# Patient Record
Sex: FEMALE | Race: WHITE | NOT HISPANIC OR LATINO | URBAN - METROPOLITAN AREA
[De-identification: names, ages, dates, MRNs, and addresses within clinical notes are randomized per-mention and may not be internally consistent; named-entity substitution may affect disease eponyms.]

---

## 2017-01-09 ENCOUNTER — AMBULATORY - HEALTHEAST (OUTPATIENT)
Dept: CARDIOLOGY | Facility: CLINIC | Age: 82
End: 2017-01-09

## 2017-01-09 DIAGNOSIS — Z95.0 PACEMAKER: ICD-10-CM

## 2017-05-12 ENCOUNTER — AMBULATORY - HEALTHEAST (OUTPATIENT)
Dept: CARDIOLOGY | Facility: CLINIC | Age: 82
End: 2017-05-12

## 2017-05-16 ENCOUNTER — AMBULATORY - HEALTHEAST (OUTPATIENT)
Dept: CARDIOLOGY | Facility: CLINIC | Age: 82
End: 2017-05-16

## 2017-05-16 DIAGNOSIS — I10 ESSENTIAL HYPERTENSION WITH GOAL BLOOD PRESSURE LESS THAN 140/90: ICD-10-CM

## 2017-05-16 DIAGNOSIS — I48.20 CHRONIC ATRIAL FIBRILLATION (H): ICD-10-CM

## 2017-05-16 DIAGNOSIS — Z95.0 CARDIAC RESYNCHRONIZATION THERAPY PACEMAKER (CRT-P) IN PLACE: ICD-10-CM

## 2017-05-16 DIAGNOSIS — Z95.0 PACEMAKER: ICD-10-CM

## 2017-05-16 DIAGNOSIS — I10 ESSENTIAL HYPERTENSION WITH GOAL BLOOD PRESSURE LESS THAN 130/85: ICD-10-CM

## 2017-05-16 ASSESSMENT — MIFFLIN-ST. JEOR: SCORE: 1291.78

## 2017-05-17 LAB — HCC DEVICE COMMENTS: NORMAL

## 2017-06-02 ENCOUNTER — OFFICE VISIT - HEALTHEAST (OUTPATIENT)
Dept: CARDIOLOGY | Facility: CLINIC | Age: 82
End: 2017-06-02

## 2017-06-02 DIAGNOSIS — I10 ESSENTIAL HYPERTENSION WITH GOAL BLOOD PRESSURE LESS THAN 130/85: ICD-10-CM

## 2017-06-02 ASSESSMENT — MIFFLIN-ST. JEOR: SCORE: 1282.71

## 2017-06-29 ENCOUNTER — COMMUNICATION - HEALTHEAST (OUTPATIENT)
Dept: CARDIOLOGY | Facility: CLINIC | Age: 82
End: 2017-06-29

## 2017-08-09 ENCOUNTER — AMBULATORY - HEALTHEAST (OUTPATIENT)
Dept: CARDIOLOGY | Facility: CLINIC | Age: 82
End: 2017-08-09

## 2017-08-09 DIAGNOSIS — Z95.0 PACEMAKER: ICD-10-CM

## 2017-08-09 LAB — HCC DEVICE COMMENTS: NORMAL

## 2017-10-30 ENCOUNTER — AMBULATORY - HEALTHEAST (OUTPATIENT)
Dept: CARDIOLOGY | Facility: CLINIC | Age: 82
End: 2017-10-30

## 2017-10-30 DIAGNOSIS — Z95.0 CARDIAC RESYNCHRONIZATION THERAPY PACEMAKER (CRT-P) IN PLACE: ICD-10-CM

## 2017-10-31 LAB — HCC DEVICE COMMENTS: NORMAL

## 2018-02-05 ENCOUNTER — AMBULATORY - HEALTHEAST (OUTPATIENT)
Dept: CARDIOLOGY | Facility: CLINIC | Age: 83
End: 2018-02-05

## 2018-02-05 DIAGNOSIS — Z95.0 CARDIAC RESYNCHRONIZATION THERAPY PACEMAKER (CRT-P) IN PLACE: ICD-10-CM

## 2018-02-05 LAB — HCC DEVICE COMMENTS: NORMAL

## 2018-04-23 LAB
CREAT SERPL-MCNC: 0.9 MG/DL (ref 0.57–1.11)
GFR SERPL CREATININE-BSD FRML MDRD: 59 ML/MIN/1.73M2
GLUCOSE SERPL-MCNC: 85 MG/DL (ref 70–95)
POTASSIUM SERPL-SCNC: 4.3 MMOL/L (ref 3.5–5.1)
TSH SERPL-ACNC: 3.88 UIU/ML (ref 0.35–4.94)

## 2018-05-29 ENCOUNTER — OFFICE VISIT (OUTPATIENT)
Dept: DERMATOLOGY | Facility: CLINIC | Age: 83
End: 2018-05-29
Payer: MEDICARE

## 2018-05-29 VITALS — DIASTOLIC BLOOD PRESSURE: 67 MMHG | OXYGEN SATURATION: 97 % | SYSTOLIC BLOOD PRESSURE: 131 MMHG | HEART RATE: 66 BPM

## 2018-05-29 DIAGNOSIS — L82.1 SK (SEBORRHEIC KERATOSIS): ICD-10-CM

## 2018-05-29 DIAGNOSIS — L81.4 LENTIGO: Primary | ICD-10-CM

## 2018-05-29 DIAGNOSIS — L85.3 XEROSIS OF SKIN: ICD-10-CM

## 2018-05-29 DIAGNOSIS — D18.00 ANGIOMA: ICD-10-CM

## 2018-05-29 PROCEDURE — 99203 OFFICE O/P NEW LOW 30 MIN: CPT | Performed by: DERMATOLOGY

## 2018-05-29 RX ORDER — LEVOTHYROXINE SODIUM 112 MCG
112 TABLET ORAL DAILY
COMMUNITY
Start: 2018-05-01

## 2018-05-29 RX ORDER — ESCITALOPRAM OXALATE 10 MG/1
10 TABLET ORAL DAILY
COMMUNITY
Start: 2018-05-25

## 2018-05-29 RX ORDER — RIVAROXABAN 20 MG/1
TABLET, FILM COATED ORAL
Refills: 1 | COMMUNITY
Start: 2018-04-18 | End: 2018-08-23

## 2018-05-29 NOTE — MR AVS SNAPSHOT
After Visit Summary   5/29/2018    Karina Marie    MRN: [id number]           Patient Information     Date Of Birth          3/5/1931        Visit Information        Provider Department      5/29/2018 2:15 PM Asim Ignacio MD [facility]        Today's Diagnoses     Lentigo    -  1    SK (seborrheic keratosis)        Angioma        Xerosis of skin          Care Instructions    1. Take Claritin in the morning  2. Take Zyrtec at night  3. Follow skin care recommendations below  4. Recheck in 2 months      Proper skin care from Dr. Ignacio- [facility]     Eliminate harsh soaps, i.e. Dial, Zest, [place] Spring;   Use mild soaps such as Cetaphil or Dove Sensitive Skin   Avoid hot or cold showers   After showering, lightly dry off.    Aggressive use of a moisturizer (including Vanicream, Cetaphil, Aquaphor or Cerave)   We recommend using a tub that needs to be scooped out, not a pump. This has more of an oil base. It will hold moisture in your skin much better than a water base moisturizer. The ones recommended are non- pore clogging.       If you have any questions call [phone] and follow the prompts to Dr. Ignacio's office.             Follow-ups after your visit        Who to contact     If you have questions or need follow up information about today's clinic visit or your schedule please contact [facility] directly at [phone].  Normal or non-critical lab and imaging results will be communicated to you by MyChart, letter or phone within 4 business days after the clinic has received the results. If you do not hear from us within 7 days, please contact the clinic through MyChart or phone. If you have a critical or abnormal lab result, we will notify you by phone as soon as possible.  Submit refill requests through [employer] or call your pharmacy and they will forward the refill request to us. Please allow 3 business days for your refill to be completed.           Additional Information About Your Visit        Care EveryWhere ID     This is your Care EveryWhere ID. This could be used by other organizations to access your [place] medical records  [id number]        Your Vitals Were     Pulse Pulse Oximetry                66 97%           Blood Pressure from Last 3 Encounters:   05/29/18 131/67   05/26/15 143/66   05/11/15 139/71    Weight from Last 3 Encounters:   No data found for Wt              Today, you had the following     No orders found for display       Primary Care Provider Office Phone # Fax #    Char Catarino Dutta [phone] [address] [facility] [address]        Equal Access to Services     EVELINA BAER : Hadii aad ku hadasho Sovanitaali, waaxda luqadaha, qaybta kaalmada adeegyada, prasanna muhammad . So [facility] [phone].    ATENCIÓN: Si habla español, tiene a morris disposición servicios gratuitos de asistencia lingüística. Llame al [phone].    We comply with applicable federal civil rights laws and [place] laws. We do not discriminate on the basis of race, color, national origin, age, disability, sex, sexual orientation, or gender identity.            Thank you!     Thank you for choosing [facility]  for your care. Our goal is always to provide you with excellent care. Hearing back from our patients is one way we can continue to improve our services. Please take a few minutes to complete the written survey that you may receive in the mail after your visit with us. Thank you!             Your Updated Medication List - Protect others around you: Learn how to safely use, store and throw away your medicines at www.disposemymeds.org.          This list is accurate as of 5/29/18  2:44 PM.  Always use your most recent med list.                   Brand Name Dispense Instructions for use Diagnosis    ACETAMINOPHEN PO       Nummular dermatitis       CALCIUM 600 PO        Nummular dermatitis       CARVEDILOL PO       Nummular dermatitis       CENTRUM SILVER per tablet      Take 1 tablet by mouth daily    Nummular dermatitis       cetirizine 10 MG tablet    zyrTEC    90 tablet    Take 1 tablet (10 mg) by mouth every evening    Nummular dermatitis       desoximetasone 0.25 % cream    TOPICORT    60 g    Apply topically 2 times daily Apply sparingly to affected area twice daily.  Do not apply to face.    Nummular dermatitis       escitalopram 10 MG tablet    LEXAPRO          * levothyroxine 100 MCG tablet    SYNTHROID/LEVOTHROID     Take by mouth daily    Nummular dermatitis       * SYNTHROID 112 MCG tablet   Generic drug:  levothyroxine           warfarin 2.5 MG tablet    COUMADIN     Take by mouth daily    Nummular dermatitis       XARELTO 20 MG Tabs tablet   Generic drug:  rivaroxaban ANTICOAGULANT           * Notice:  This list has 2 medication(s) that are the same as other medications prescribed for you. Read the directions carefully, and ask your doctor or other care provider to review them with you.

## 2018-05-29 NOTE — PROGRESS NOTES
Karina Marie is a 87 year old year old female patient here today for itching on back.   .  Patient states this has been present for a while.  Using dove soap.  .  Patient reports the following symptoms:  itching.  Patient reports the following previous treatments none.  Patient reports the following modifying factors none.  Associated symptoms: none.  Patient has no other skin complaints today.  Remainder of the HPI, Meds, PMH, Allergies, FH, and SH was reviewed in chart.    History reviewed. No pertinent past medical history.    History reviewed. No pertinent surgical history.     History reviewed. No pertinent family history.    Social History     Social History     Marital status:      Spouse name: N/A     Number of children: N/A     Years of education: N/A     Occupational History     Not on file.     Social History Main Topics     Smoking status: Never Smoker     Smokeless tobacco: Never Used     Alcohol use Not on file     Drug use: Not on file     Sexual activity: Not on file     Other Topics Concern     Not on file     Social History Narrative       Outpatient Encounter Prescriptions as of 5/29/2018   Medication Sig Dispense Refill     ACETAMINOPHEN PO        Calcium Carbonate (CALCIUM 600 PO)        CARVEDILOL PO        cetirizine (ZYRTEC) 10 MG tablet Take 1 tablet (10 mg) by mouth every evening (Patient not taking: Reported on 5/29/2018) 90 tablet 3     desoximetasone (TOPICORT) 0.25 % cream Apply topically 2 times daily Apply sparingly to affected area twice daily.  Do not apply to face. (Patient not taking: Reported on 5/29/2018) 60 g 2     escitalopram (LEXAPRO) 10 MG tablet        levothyroxine (SYNTHROID,LEVOTHROID) 100 MCG tablet Take by mouth daily       Multiple Vitamins-Minerals (CENTRUM SILVER) per tablet Take 1 tablet by mouth daily       SYNTHROID 112 MCG tablet        warfarin (COUMADIN) 2.5 MG tablet Take by mouth daily       XARELTO 20 MG TABS tablet   1     No  facility-administered encounter medications on file as of 5/29/2018.              Review Of Systems  Skin: As above  Eyes: negative  Ears/Nose/Throat: negative  Respiratory: No shortness of breath, dyspnea on exertion, cough, or hemoptysis  Cardiovascular: negative  Gastrointestinal: negative  Genitourinary: negative  Musculoskeletal: negative  Neurologic: negative  Psychiatric: negative  Hematologic/Lymphatic/Immunologic: negative  Endocrine: negative      O:   NAD, WDWN, Alert & Oriented, Mood & Affect wnl, Vitals stable   Here today with     There were no vitals taken for this visit.   General appearance normal   Vitals stable   Alert, oriented and in no acute distress      Following lymph nodes palpated: Occipital, Cervical, Supraclavicular no lad   Stuck on papules and brown macules on trunk and ext    Red papule son trunk     Xerosis throughout      No visible rash   The remainder of expanded problem focused exam was unremarkable; the following areas were examined:  scalp/hair, conjunctiva/lids, face, neck, lips, backRUE, LUE.      Eyes: Conjunctivae/lids:Normal     ENT: Lips, buccal mucosa, tongue: normal    MSK:Normal    Cardiovascular: peripheral edema none    Pulm: Breathing Normal    Lymph Nodes: No Head and Neck Lymphadenopathy     Neuro/Psych: Orientation:Normal; Mood/Affect:Normal      A/P:  1. Seborrheic keratosis, lentigo, angioma  2. Xerosis   calritin in am  Zyrtec bedtime  Emollient use discussed with patient   Return to clinic 2 months    BENIGN LESIONS DISCUSSED WITH PATIENT:  I discussed the specifics of tumor, prognosis, and genetics of benign lesions.  I explained that treatment of these lesions would be purely cosmetic and not medically neccessary.  I discussed with patient different removal options including excision, cautery and /or laser.      Nature and genetics of benign skin lesions dicussed with patient.  Signs and Symptoms of skin cancer discussed with patient.  Patient to  follow up with Primary Care provider regarding elevated blood pressure.  Patient encouraged to perform monthly skin exams.  UV precautions reviewed with patient.  Patient to follow up with Primary Care provider regarding elevated blood pressure.  Skin care regimen reviewed with patient: Eliminate harsh soaps, i.e. Dial, zest, irsih spring; Mild soaps such as Cetaphil or Dove sensitive skin, avoid hot or cold showers, aggressive use of emollients including vanicream, cetaphil or cerave discussed with patient.    Risks of non-melanoma skin cancer discussed with patient   Return to clinic 2 months

## 2018-05-29 NOTE — LETTER
5/29/2018         RE: Karina Marie  [address] [place] [id number]        Dear Colleague,    Thank you for referring your patient, Karina Marie, to the [facility]. Please see a copy of my visit note below.    Karina Marie is a 87 year old year old female patient here today for itching on back.   .  Patient states this has been present for a while.  Using dove soap.  .  Patient reports the following symptoms:  itching.  Patient reports the following previous treatments none.  Patient reports the following modifying factors none.  Associated symptoms: none.  Patient has no other skin complaints today.  Remainder of the HPI, Meds, PMH, Allergies, FH, and SH was reviewed in chart.    History reviewed. No pertinent past medical history.    History reviewed. No pertinent surgical history.     History reviewed. No pertinent family history.    Social History     Social History     Marital status:      Spouse name: N/A     Number of children: N/A     Years of education: N/A     Occupational History     Not on file.     Social History Main Topics     Smoking status: Never Smoker     Smokeless tobacco: Never Used     Alcohol use Not on file     Drug use: Not on file     Sexual activity: Not on file     Other Topics Concern     Not on file     Social History Narrative       Outpatient Encounter Prescriptions as of 5/29/2018   Medication Sig Dispense Refill     ACETAMINOPHEN PO        Calcium Carbonate (CALCIUM 600 PO)        CARVEDILOL PO        cetirizine (ZYRTEC) 10 MG tablet Take 1 tablet (10 mg) by mouth every evening (Patient not taking: Reported on 5/29/2018) 90 tablet 3     desoximetasone (TOPICORT) 0.25 % cream Apply topically 2 times daily Apply sparingly to affected area twice daily.  Do not apply to face. (Patient not taking: Reported on 5/29/2018) 60 g 2     escitalopram (LEXAPRO) 10 MG tablet        levothyroxine (SYNTHROID,LEVOTHROID) 100 MCG tablet Take by mouth daily        Multiple Vitamins-Minerals (CENTRUM SILVER) per tablet Take 1 tablet by mouth daily       SYNTHROID 112 MCG tablet        warfarin (COUMADIN) 2.5 MG tablet Take by mouth daily       XARELTO 20 MG TABS tablet   1     No facility-administered encounter medications on file as of 5/29/2018.              Review Of Systems  Skin: As above  Eyes: negative  Ears/Nose/Throat: negative  Respiratory: No shortness of breath, dyspnea on exertion, cough, or hemoptysis  Cardiovascular: negative  Gastrointestinal: negative  Genitourinary: negative  Musculoskeletal: negative  Neurologic: negative  Psychiatric: negative  Hematologic/Lymphatic/Immunologic: negative  Endocrine: negative      O:   NAD, WDWN, Alert & Oriented, Mood & Affect wnl, Vitals stable   Here today with     There were no vitals taken for this visit.   General appearance normal   Vitals stable   Alert, oriented and in no acute distress      Following lymph nodes palpated: Occipital, Cervical, Supraclavicular no lad   Stuck on papules and brown macules on trunk and ext    Red papule son trunk     Xerosis throughout      No visible rash   The remainder of expanded problem focused exam was unremarkable; the following areas were examined:  scalp/hair, conjunctiva/lids, face, neck, lips, backRUE, LUE.      Eyes: Conjunctivae/lids:Normal     ENT: Lips, buccal mucosa, tongue: normal    MSK:Normal    Cardiovascular: peripheral edema none    Pulm: Breathing Normal    Lymph Nodes: No Head and Neck Lymphadenopathy     Neuro/Psych: Orientation:Normal; Mood/Affect:Normal      A/P:  1. Seborrheic keratosis, lentigo, angioma  2. Xerosis   calritin in am  Zyrtec bedtime  Emollient use discussed with patient   Return to clinic 2 months    BENIGN LESIONS DISCUSSED WITH PATIENT:  I discussed the specifics of tumor, prognosis, and genetics of benign lesions.  I explained that treatment of these lesions would be purely cosmetic and not medically neccessary.  I discussed with  patient different removal options including excision, cautery and /or laser.      Nature and genetics of benign skin lesions dicussed with patient.  Signs and Symptoms of skin cancer discussed with patient.  Patient to follow up with Primary Care provider regarding elevated blood pressure.  Patient encouraged to perform monthly skin exams.  UV precautions reviewed with patient.  Patient to follow up with Primary Care provider regarding elevated blood pressure.  Skin care regimen reviewed with patient: Eliminate harsh soaps, i.e. Dial, zest, irsih spring; Mild soaps such as Cetaphil or Dove sensitive skin, avoid hot or cold showers, aggressive use of emollients including vanicream, cetaphil or cerave discussed with patient.    Risks of non-melanoma skin cancer discussed with patient   Return to clinic 2 months      Again, thank you for allowing me to participate in the care of your patient.        Sincerely,        Asim Ignacio MD

## 2018-05-29 NOTE — PATIENT INSTRUCTIONS
1. Take Claritin in the morning  2. Take Zyrtec at night  3. Follow skin care recommendations below  4. Recheck in 2 months      Proper skin care from Dr. Ignacio- [facility]     Eliminate harsh soaps, i.e. Dial, Zest, [place] Spring;   Use mild soaps such as Cetaphil or Dove Sensitive Skin   Avoid hot or cold showers   After showering, lightly dry off.    Aggressive use of a moisturizer (including Vanicream, Cetaphil, Aquaphor or Cerave)   We recommend using a tub that needs to be scooped out, not a pump. This has more of an oil base. It will hold moisture in your skin much better than a water base moisturizer. The ones recommended are non- pore clogging.       If you have any questions call [phone] and follow the prompts to Dr. Ignacio's office.

## 2018-05-30 ENCOUNTER — AMBULATORY - HEALTHEAST (OUTPATIENT)
Dept: CARDIOLOGY | Facility: CLINIC | Age: 83
End: 2018-05-30

## 2018-05-30 DIAGNOSIS — Z95.0 CARDIAC RESYNCHRONIZATION THERAPY PACEMAKER (CRT-P) IN PLACE: ICD-10-CM

## 2018-06-01 LAB
HCC DEVICE COMMENTS: NORMAL
HCC DEVICE IMPLANTING PROVIDER: NORMAL
HCC DEVICE MANUFACTURE: NORMAL
HCC DEVICE MODEL: NORMAL
HCC DEVICE SERIAL NUMBER: NORMAL
HCC DEVICE TYPE: NORMAL

## 2018-06-13 ENCOUNTER — TRANSFERRED RECORDS (OUTPATIENT)
Dept: HEALTH INFORMATION MANAGEMENT | Facility: CLINIC | Age: 83
End: 2018-06-13

## 2018-06-19 ENCOUNTER — TRANSFERRED RECORDS (OUTPATIENT)
Dept: HEALTH INFORMATION MANAGEMENT | Facility: CLINIC | Age: 83
End: 2018-06-19

## 2018-06-22 ENCOUNTER — TRANSFERRED RECORDS (OUTPATIENT)
Dept: HEALTH INFORMATION MANAGEMENT | Facility: CLINIC | Age: 83
End: 2018-06-22

## 2018-07-30 ENCOUNTER — AMBULATORY - HEALTHEAST (OUTPATIENT)
Dept: CARDIOLOGY | Facility: CLINIC | Age: 83
End: 2018-07-30

## 2018-07-30 DIAGNOSIS — I10 ESSENTIAL HYPERTENSION: ICD-10-CM

## 2018-07-30 DIAGNOSIS — Z95.0 CARDIAC RESYNCHRONIZATION THERAPY PACEMAKER (CRT-P) IN PLACE: ICD-10-CM

## 2018-07-30 DIAGNOSIS — F03.90 SENILE DEMENTIA (H): ICD-10-CM

## 2018-07-30 DIAGNOSIS — I48.20 CHRONIC ATRIAL FIBRILLATION (H): ICD-10-CM

## 2018-07-30 DIAGNOSIS — I34.0 NON-RHEUMATIC MITRAL REGURGITATION: ICD-10-CM

## 2018-07-30 ASSESSMENT — MIFFLIN-ST. JEOR: SCORE: 1296.32

## 2018-08-23 PROBLEM — R41.89 IMPAIRED COGNITION: Status: ACTIVE | Noted: 2018-02-27

## 2018-08-23 PROBLEM — I10 ESSENTIAL HYPERTENSION: Status: ACTIVE | Noted: 2018-08-23

## 2018-08-23 PROBLEM — E03.9 HYPOTHYROIDISM: Status: ACTIVE | Noted: 2018-08-23

## 2018-08-23 PROBLEM — E04.1 RIGHT THYROID NODULE: Status: ACTIVE | Noted: 2017-04-25

## 2018-08-23 PROBLEM — K58.9 IRRITABLE BOWEL SYNDROME: Status: ACTIVE | Noted: 2018-08-23

## 2018-08-23 PROBLEM — E78.5 HYPERLIPIDEMIA: Status: ACTIVE | Noted: 2018-08-23

## 2018-08-23 PROBLEM — E66.9 OBESITY: Status: ACTIVE | Noted: 2018-08-23

## 2018-08-23 PROBLEM — K90.41 GLUTEN INTOLERANCE: Status: ACTIVE | Noted: 2018-03-09

## 2018-08-23 PROBLEM — I34.0 NON-RHEUMATIC MITRAL REGURGITATION: Status: ACTIVE | Noted: 2018-08-23

## 2018-10-31 ENCOUNTER — AMBULATORY - HEALTHEAST (OUTPATIENT)
Dept: CARDIOLOGY | Facility: CLINIC | Age: 83
End: 2018-10-31

## 2018-10-31 DIAGNOSIS — Z95.0 CARDIAC RESYNCHRONIZATION THERAPY PACEMAKER (CRT-P) IN PLACE: ICD-10-CM

## 2019-02-22 ENCOUNTER — AMBULATORY - HEALTHEAST (OUTPATIENT)
Dept: CARDIOLOGY | Facility: CLINIC | Age: 84
End: 2019-02-22

## 2019-02-22 DIAGNOSIS — Z95.0 CARDIAC RESYNCHRONIZATION THERAPY PACEMAKER (CRT-P) IN PLACE: ICD-10-CM

## 2019-05-22 ENCOUNTER — AMBULATORY - HEALTHEAST (OUTPATIENT)
Dept: CARDIOLOGY | Facility: CLINIC | Age: 84
End: 2019-05-22

## 2019-05-22 DIAGNOSIS — Z95.0 CARDIAC RESYNCHRONIZATION THERAPY PACEMAKER (CRT-P) IN PLACE: ICD-10-CM

## 2019-05-28 ENCOUNTER — COMMUNICATION - HEALTHEAST (OUTPATIENT)
Dept: CARDIOLOGY | Facility: CLINIC | Age: 84
End: 2019-05-28

## 2021-02-17 ENCOUNTER — COMMUNICATION - HEALTHEAST (OUTPATIENT)
Dept: CARDIOLOGY | Facility: CLINIC | Age: 86
End: 2021-02-17

## 2021-05-29 NOTE — TELEPHONE ENCOUNTER
Attempted to contact pt, voicemail message was left with contact information and instructing pt to call back.  5/30/2019 3:29 PM  Tequila Almanza RN

## 2021-05-29 NOTE — TELEPHONE ENCOUNTER
Attempted to contact pt, voicemail message was left with contact information and instructing pt to call back.   Reviewing records, last Echo was in 2016, order for Echo was placed by Dr Bell back on 7/30/18, and pt is due for EP NP yrly visit in July.  Multiple attempts have been made to contact  and return call, will await further contact from pt/.  6/4/2019 2:49 PM  Tequila Almanza RN

## 2021-05-29 NOTE — TELEPHONE ENCOUNTER
----- Message from Shaye Cristina sent at 5/24/2019  4:44 PM CDT -----  Contact:   General phone call:    Caller: Patient's   Primary cardiologist: Dr. Bell  Detailed reason for call: Pt's  called this a.m. -is calling again for results of pt's echo- hasn't heard back- pt is in nursing home and they are waiting for results to sched another test  New or active symptoms?   Best phone number: [phone]  Best time to contact:   Ok to leave a detailed message?   Device? Yes    Additional Info:

## 2021-05-31 VITALS — HEIGHT: 67 IN | WEIGHT: 183 LBS | BODY MASS INDEX: 28.72 KG/M2

## 2021-05-31 VITALS — WEIGHT: 185 LBS | HEIGHT: 67 IN | BODY MASS INDEX: 29.03 KG/M2

## 2021-06-01 VITALS — BODY MASS INDEX: 29.19 KG/M2 | HEIGHT: 67 IN | WEIGHT: 186 LBS

## 2021-06-10 NOTE — PROGRESS NOTES
[facility]   Arrhythmia Clinic    Assessment/Plan:  Diagnoses and all orders for this visit:    Chronic atrial fibrillation with [id number] score of 4 and on warfarin.  Rate control by biV pacer with AV node ablation.    Cardiac resynchronization therapy pacemaker (CRT-P) in place-Medtronic  Cardiomyopathy resolved.  Will try to decrease carvedilol dose from 25 to 12.5 mg orally twice a day and add low-dose lisinopril of 10 mg a half a tab p.o. Daily= 5 mg which would be of benefit with chronic kidney disease as well.    Essential hypertension with goal blood pressure less than 140/90 and at goal.  To follow-up with Dr. Good in 1 month for reassessment of hypertension control with above med changes.  If no change in energy level okay to revert back to carvedilol 25 mg orally twice daily and discontinue lisinopril if not desired.    Essential hypertension with goal blood pressure less than 130/85   -     Discontinue: lisinopril (PRINIVIL) 10 MG tablet; Take 0.5 tablets (5 mg total) by mouth daily.  Dispense: 30 tablet; Refill: 4  -     lisinopril (PRINIVIL) 10 MG tablet; Take 0.5 tablets (5 mg total) by mouth daily.  Dispense: 15 tablet; Refill: 4    Other orders  -     donepezil (ARICEPT) 5 MG tablet; Take 5 mg by mouth at bedtime.  -     methylPREDNISolone (MEDROL DOSEPACK) 4 mg tablet; Take 4 mg by mouth 2 (two) times a day. follow package directions      ______________________________________________________________________    Subjective:    I had the opportunity to see Karina Marie at the [facility]. Karina aMrie is a 86 y.o. female and following up in clinic today for chronic atrial fibrillation and biventricular pacemaker check before appt .  Karina Marie has a known history of chronic atrial fibrillation with RVR and had AV node ablation and upgrade of pacemaker to bi V pacer due to pacer induced cardiomyopathy.  Echo last year showed cardiomyopathy resolved.  She is  taking care of her  and needs some help with especially dressing.  They live in a town home and she does some cooking and housecleaning.  She has some help now.  Her main complaint is she has fatigue.  She thinks that this is worse over the last year.  See above for more details.  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/2016     Disease of thyroid gland     Hypothyroidism     Hyperlipidemia      Hypertension      Irritable bowel syndrome (IBS)      Mitral regurgitation      Non-rheumatic mitral regurgitation     Mild to mod by echo Dec 2012 Mod Oct 2014 echo Moderate, echo October 2016        Obesity      Pacing induced cardiomyopathy 1/18/2016     Permanent atrial fibrillation     Tachy/Randall [id number] score = 5 (Age3/ female/ HTN/ CM) Chronic warfarin AV Node ablation 2002 in [place] Dual PPM Implanted 2002 (Pace induced CM, LVEF 20% with LV dyssynchrony in 2003) Upgrade to Biventricular PPM 2003  AV and VV Optimization 2005 Chronic Amiodarone Therapy Sleep apnea study negative in 2012 9/13 asymptomatic and off amio. 5/14 wants trial of sinus 6/14 failed trial of sinus wit     Surgical History:  Past Surgical History:   Procedure Laterality Date     CORONARY ANGIOPLASTY       INSERT / REPLACE / REMOVE PACEMAKER      3 insertions-last 2010 (3 chambers)     NC CARDIOVERSION ELECTIVE ARRHYTHMIA EXTERNAL      Description: Elective Cardioversion External;  Recorded: 06/23/2014;  Comments: 6/9/14 and reversion back in afib in less than 1 week.     NC  ICAR CATHETER ABLATION ATRIOVENTR NODE FUNCTION      Description: Catheter Ablation Atrioventricular Node;  Recorded: 08/13/2012;  Comments: 2002 in [place] (A fib with RVR)     [place] REMOVAL GALLBLADDER      Description: Cholecystectomy;  Recorded: 05/01/2014;     Social History:  Social History   Substance Use Topics     Smoking status: Never Smoker     Smokeless tobacco: None     Alcohol use No        Review of Systems: Review of Systems:   General: WNL  Eyes: WNL  Ears/Nose/Throat: WNL  Lungs: WNL  Heart: WNL  Stomach: WNL  Bladder: WNL  Muscle/Joints: WNL  Skin: WNL  Nervous System: WNL  Mental Health: WNL     Blood: WNL      Family History:  No family history on file.      Allergies:  Allergies   Allergen Reactions     Morphine Nausea Only     Medications:  Current Outpatient Prescriptions   Medication Sig Dispense Refill     acetaminophen (TYLENOL) 500 MG tablet Take 1,000 mg by mouth every 6 (six) hours as needed for pain.       calcium carbonate-vitamin D3 600 mg(1,500mg) -500 unit cap Take 1 capsule by mouth 2 (two) times a day.       carvedilol (COREG) 25 MG tablet Take 12.5 mg by mouth 2 (two) times a day with meals.       desoximetasone (TOPICORT) 0.25 % cream Apply topically as needed.       donepezil (ARICEPT) 5 MG tablet Take 5 mg by mouth at bedtime.       levothyroxine (SYNTHROID, LEVOTHROID) 112 MCG tablet Take 112 mcg by mouth daily.       methylPREDNISolone (MEDROL DOSEPACK) 4 mg tablet Take 4 mg by mouth 2 (two) times a day. follow package directions       mirabegron (MYRBETRIQ) 25 mg Tb24 ER tablet Take by mouth bedtime.       MULTIVITAMIN ORAL Take by mouth daily.       warfarin (COUMADIN) 2.5 MG tablet Every day of the week.  Hold today.       escitalopram oxalate (LEXAPRO) 10 MG tablet Take 10 mg by mouth daily.       lisinopril (PRINIVIL) 10 MG tablet Take 0.5 tablets (5 mg total) by mouth daily. 15 tablet 4     No current facility-administered medications for this visit.        Objective:  "  Vital signs:  /84 (Patient Site: Right Arm, Patient Position: Sitting, Cuff Size: Adult Regular)  Pulse 70  Resp 16  Ht 5' 7\" (1.702 m)  Wt 185 lb (83.9 kg)  BMI 28.98 kg/m2      Physical Exam:    GENERAL APPEARANCE: Alert, cooperative and in no acute distress.  HEENT: No scleral icterus. No Xanthelasma. Oral mucuos membranes pink and moist.  NECK: JVP Nl.   CHEST: clear to auscultation  CARDIOVASCULAR: S1, S2 without murmur ,clicks or rubs. Regular, regular.  Radial and posterior tibial pulses are intact and symetric.   EXTREMITIES: No cyanosis, clubbing or edema.    Results personally reviewed:  Results for orders placed during the hospital encounter of 10/12/16   Echo Complete [ECH10] 10/12/2016    Narrative     Summary   1. Normal left ventricular size and systolic performance. The ejection   fraction is estimated to be 55-60%.   2. There is mild aortic insufficiency.   3. There is moderate mitral regurgitation.   4. The left atrium is mild-moderately enlarged.      When compared to the prior real-time echocardiogram dated 16 October 2014,  the findings are fairly similar.                     Results for orders placed or performed during the hospital encounter of 06/09/14   ECG 12 lead   Result Value Ref Range    SYSTOLIC BLOOD PRESSURE  mmHg    DIASTOLIC BLOOD PRESSURE  mmHg    VENTRICULAR RATE 60 BPM    ATRIAL RATE 60 BPM    P-R INTERVAL 138 ms    QRS DURATION 154 ms    Q-T INTERVAL 546 ms    QTC CALCULATION (BEZET) 546 ms    P Axis 88 degrees    R AXIS -45 degrees    T AXIS 17 degrees    MUSE DIAGNOSIS       AV sequential or dual chamber electronic pacemaker  No previous ECGs available  Confirmed by ALBERTO KC, MARGARITA LOC: ([id number]) on 6/9/2014 2:08:21 PM         Device check shows leads stable and battery ok.  Device functioning appropriately.   Atrial pacing none as in VVIR mode  and Bi 100 ventricular pacing 100 %.  AT/AFib burden presumed to be 100%.  2 nonsustained VT episodes which are " short.  Histograms showed that heart rate is mostly 60s-70s.  She can get her heart rate over 100 at times.    TSH:   Lab Results   Component Value Date    TSH 4.1 12/20/2012     BNP: No results found for: BNP  BMP:  Lab Results   Component Value Date    CREATININE 0.83 01/18/2016    BUN 14 01/18/2016     01/18/2016    K 4.0 01/18/2016     (H) 01/18/2016    CO2 22 01/18/2016       This note has been dictated using voice recognition software. Any grammatical or context distortions are unintentional and inherent to the software.    BERENICE KING RN, [facility]  [phone]

## 2021-06-10 NOTE — PROGRESS NOTES
In clinic device check with Quan Sweeney NP. .  Please see link for full device report.  Patient was informed of results and next follow up during today's visit.

## 2021-06-15 NOTE — TELEPHONE ENCOUNTER
----- Message from Tequila Almanza RN sent at 2/17/2021 12:37 PM CST -----  PC from Tequila MOJICA from [facility] #[phone]  She states that the pt has been under their care since 2018  He  has passed away  She is calling wanting to discuss device monitoring and follow up  Can someone give her a call back to discuss  Thanks  Mana    Spoke with CC RN as above, reviewed with her that we received orders from PCP Dr. Haddad back on 6/17/2019 to stop pacemaker checks due to advanced dementia. RN appreciates that follow-up and is requesting to have someone fax a copy of that order to her. Fax #[phone]. Fax sent.     Jane Hinkle RN

## 2021-06-18 NOTE — LETTER
Letter by Selam Pearson RDCS at      Author: Selam Pearson RDCS Service: -- Author Type: --    Filed:  Encounter Date: 2/22/2019 Status: (Other)       Karina Marie c/o Nursing  [address] [place] [address]  [facility] [address]      February 22, 2019      Dear Ms. Marie,    RE: Remote Results    We are writing to you regarding your recent Remote Pacemaker check from home. Your transmission was received successfully. Battery status is satisfactory at this time.     Your results are being reviewed.    Your next device appointment will be a remote check on May 23, 2019.  You can choose the time of day you wish to transmit.    To schedule or reschedule, please call [phone] and press 1.    NOTE: If you would like to do an extra transmission, please call [phone] and press 3 to speak to a nurse BEFORE transmitting. This ensures that the Device Clinic staff is aware of the reason you are sending a transmission, and can follow-up with you after it has been reviewed.    We will be checking your implanted device from home (remotely) every three months unless otherwise instructed. We will need to see you in the clinic at least once a year. You may need to be seen in the clinic sooner depending on the results of your check.    Please be aware:    The follow-up schedule is like a Physician prescription.    Your remote monitor is paired to your specific implanted device.      Sincerely,    [facility]

## 2021-06-19 NOTE — PROGRESS NOTES
"[facility]  Arrhythmia Clinic  Matthew Bell    Referring:      Assessment:         Permanent atrial fibrillation: The patient remains rate controlled following her AV node ablation.  Her CRT-P pacemaker device is functioning normally.  She is greater than 99% biventricular paced and her left ventricular systolic performance remains normal.  The patient is tolerating oral anticoagulant therapy reasonably well although she sometimes \"forgets to take it\".    Hypertension: The patient blood pressure is at target on her current medical regimen.    Senile dementia: The patient's son informs me that her memory loss has been progressive.  She currently is on Aricept.      Recommendations:    No change in device prescription today.    No change in her current medications    Routine device clinic follow-up and with the EP nurse practitioner in 1 year.      Patient Active Problem List   Diagnosis     Hyperlipidemia     Non-rheumatic mitral regurgitation     Obesity     Essential hypertension     Chronic atrial fibrillation (H)     Irritable Bowel Syndrome     Hypothyroidism     Cardiac resynchronization therapy pacemaker (CRT-P) in place-Medtronic     Senile dementia       Subjective:  Karina Marie (87 y.o. female) returns to the arrhythmia clinic for interval reevaluation of her rhythm and device status.  The patient reports no cardiac symptoms.  She specifically denies any palpitations lightheadedness chest heaviness or shortness of breath.  She is having problems with her right leg having both pain and difficulty walking.  She is accompanied today by her son.  She reports no other medication problems at this time.  She is not experiencing any orthopnea, PND, or ankle edema.    Current Outpatient Prescriptions   Medication Sig Dispense Refill     acetaminophen (TYLENOL) 500 MG tablet Take 1,000 mg by mouth every 6 (six) hours as needed for pain.       carvedilol (COREG) 12.5 MG tablet Take 12.5 mg by " "mouth 2 (two) times a day.       desoximetasone (TOPICORT) 0.25 % cream Apply topically as needed.       escitalopram oxalate (LEXAPRO) 10 MG tablet Take 10 mg by mouth daily.       levothyroxine (SYNTHROID, LEVOTHROID) 112 MCG tablet Take 112 mcg by mouth daily.       MULTIVITAMIN ORAL Take by mouth daily.       XARELTO 20 mg Tab Take 20 mg by mouth daily.       calcium carbonate-vitamin D3 600 mg(1,500mg) -500 unit cap Take 1 capsule by mouth 2 (two) times a day.       carvedilol (COREG) 25 MG tablet Take 12.5 mg by mouth 2 (two) times a day with meals.       donepezil (ARICEPT) 5 MG tablet Take 5 mg by mouth at bedtime.       methylPREDNISolone (MEDROL DOSEPACK) 4 mg tablet Take 4 mg by mouth 2 (two) times a day. follow package directions       mirabegron (MYRBETRIQ) 25 mg Tb24 ER tablet Take 25 mg by mouth at bedtime.        No current facility-administered medications for this visit.        Review of Systems:   General: WNL  Eyes: WNL  Ears/Nose/Throat: WNL  Lungs: WNL  Heart: Leg Swelling  Stomach: WNL  Bladder: Frequent Urination at Night  Muscle/Joints: Joint Pain  Skin: WNL  Nervous System: Daytime Sleepiness, Loss of Balance  Mental Health: WNL     Blood: WNL    Family History  History reviewed. No pertinent family history.    Social History   reports that she has never smoked. She has never used smokeless tobacco. She reports that she does not drink alcohol or use illicit drugs.    Objective:   Vital signs in last 24 hours:  /78 (Patient Site: Right Arm, Patient Position: Sitting, Cuff Size: Adult Regular)  Pulse 68  Resp 16  Ht 5' 7\" (1.702 m)  Wt 186 lb (84.4 kg)  BMI 29.13 kg/m2  Weight: Weight: 186 lb (84.4 kg)     Physical Exam:  General: The patient is alert oriented to person place and situation.  The patient is in no acute distress at the time of my evaluation.  Eyes: Pupils are equal, round, and reactive to light.  Conjunctiva and sclera are clear.  ENT: Oral mucosa is moist and " without redness. No evident nasal discharge.  Pulmonary: Lungs are clear bilaterally with no rales, rhonchi, or wheezes.    Cardiovascular exam: Rhythm is regular. S1 and S2 are normal. No significant murmur is present. JVP is normal. Lower extremities demonstrate trace bilateral edema. Distal pulses are intact bilaterally.  Abdomen is flat, soft, and nontender.  Musculoskeletal: Spine is straight. Extremities without deformity.  Neuro: Gait is normal.   Skin is warm, dry, and otherwise intact.      Cardiographics:   CRT-P device interrogation today demonstrates normal battery monitoring voltage.  The right ventricular left ventricular lead status remained stable and normal.  Device diagnostics show 99.8% biventricular pacing.  No significant ventricular arrhythmia.  No intrinsic rhythm.    Lab Results:   Lab Results   Component Value Date     01/18/2016    K 4.0 01/18/2016     (H) 01/18/2016    CO2 22 01/18/2016    BUN 14 01/18/2016    CREATININE 0.83 01/18/2016    CALCIUM 9.2 01/18/2016     Lab Results   Component Value Date    WBC 7.3 01/18/2016    HGB 14.3 01/18/2016    HCT 44.2 01/18/2016     01/18/2016     01/18/2016     Lab Results   Component Value Date    INR 1.46 (H) 01/18/2016    INR 4.00 (!) 06/09/2014         Outside record review:

## 2021-06-19 NOTE — PROGRESS NOTES
In clinic device check with follow up with Dr. Bell.  Please see link for full device report.  Patient was informed of results and next follow up during today's visit.

## 2021-06-19 NOTE — LETTER
Letter by Belle Maxwell EPS at      Author: Belle Maxwell EPS Service: -- Author Type: --    Filed:  Encounter Date: 5/22/2019 Status: (Other)         Karina Marie  [address] [place] [address]      May 22, 2019      Dear Ms. Marie,    RE: Remote Results    We are writing to you regarding your recent Remote Pacemaker check from home. Your transmission was received successfully. Battery status is satisfactory at this time.     Your results are within normal limits.    Your next device appointment will be a clinic visit.  Please call in May to schedule.      To schedule or reschedule, please call [phone] and press 1.    NOTE: If you would like to do an extra transmission, please call [phone] and press 3 to speak to a nurse BEFORE transmitting. This ensures that the Device Clinic staff is aware of the reason you are sending a transmission, and can follow-up with you after it has been reviewed.    We will be checking your implanted device from home (remotely) every three months unless otherwise instructed. We will need to see you in the clinic at least once a year. You may need to be seen in the clinic sooner depending on the results of your check.    Please be aware:    The follow-up schedule is like a Physician prescription.    Your remote monitor is paired to your specific implanted device.      Sincerely,    [facility] Device [facility]

## 2021-06-25 NOTE — PROGRESS NOTES
Progress Notes by Dana Gates MD at 6/2/2017 12:50 PM     Author: Dana Gates MD Service: -- Author Type: Physician    Filed: 6/2/2017  1:27 PM Encounter Date: 6/2/2017 Status: Signed    : Dana Gates MD (Physician)           Click to link to [facility] HEART CARE NOTE    Thank you, Dr. Good, for asking us to see Karina Marie at the [facility].      Assessment/Recommendations   Assessment:    1.  Hypertension: Increase lisinopril to 10 mg daily.  Continue carvedilol 12.5 mg twice a day.  2.  Cardiomyopathy: History of pacing induced cardiomyopathy after AV sondra ablation with recent echocardiogram showing preserved left ventricular systolic function.  She is status post CRT device upgrade.  3.  Atrial fibrillation: Chronic atrial fibrillation status post AV sondra ablation.  Rate controlled.  Continue on Coumadin for anticoagulation.  4.  Follow-up in 1 year.       History of Present Illness    Ms. Karina Marie is a 86 y.o. female with history of permanent atrial fibrillation, status post AV node ablation in 2002, status post PPM with upgrade to biventricular pacemaker in 2003 secondary to pacing-induced cardiomyopathy with an LVEF of 20% who now has normal left ventricular systolic function.  I am seeing her today in annual follow-up.  She has been doing well.  She has not noted any problems with shortness of breath or chest pain or palpitations.  Her carvedilol dose was recently decreased to 12.5 mg twice a day from 25 mg twice a day by Quan Sweeney.  She had lisinopril 5 mg a day added.  Today her blood pressure is elevated.       Physical Examination Review of Systems   Vitals:    06/02/17 1259   BP: 162/90   Pulse: 84   Resp: 16     Body mass index is 28.66 kg/(m^2).  Wt Readings from Last 3 Encounters:   06/02/17 183 lb (83 kg)   05/16/17 185 lb (83.9 kg)   10/12/16 195 lb (88.5 kg)       General Appearance:   alert,  no apparent distress   HEENT:  no scleral icterus; the mucous membranes are pink and moist                                  Neck: jugular venous pressure normal   Chest: the spine is straight and the chest is symmetric   Lungs:   respirations unlabored; the lungs are clear to auscultation   Cardiovascular:   regular rhythm with normal first and second heart sounds and no murmurs or gallops; carotid pulses are intact and there are no carotid  bruits.   Abdomen:  no organomegaly, masses, bruits, or tenderness; bowel sounds are present   Extremities: mild edema   Skin: no xanthelasma    General: WNL  Eyes: WNL  Ears/Nose/Throat: WNL  Lungs: WNL  Heart: WNL  Stomach: WNL  Bladder: WNL  Muscle/Joints: WNL  Skin: WNL  Nervous System: WNL  Mental Health: WNL     Blood: WNL     Medical History  Surgical History Family History Social History   Past Medical History:   Diagnosis Date   ? Arrhythmia    ? 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 Disease of thyroid gland     Hypothyroidism   ? Hyperlipidemia    ? Hypertension    ? Irritable bowel syndrome (IBS)    ? Mitral regurgitation    ? Non-rheumatic mitral regurgitation     Mild to mod by echo Dec 2012 Mod Oct 2014 echo Moderate, echo October 2016      ? Obesity    ? 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 Permanent atrial fibrillation     Tachy/Randall [id number] score = 5 (Age3/ female/ HTN/ CM) Chronic warfarin AV Node ablation 2002 in [place] Dual PPM Implanted 2002 (Pace induced CM, LVEF 20% with LV dyssynchrony in 2003) Upgrade to Biventricular PPM 2003  AV and VV Optimization 2005 Chronic Amiodarone Therapy Sleep apnea study negative in 2012 9/13 asymptomatic and off amio. 5/14 wants trial of sinus 6/14 failed trial of sinus wit    Past Surgical History:   Procedure Laterality Date   ? CORONARY ANGIOPLASTY     ? INSERT / REPLACE / REMOVE PACEMAKER      3 insertions-last 2010 (3 chambers)   ? DC CARDIOVERSION ELECTIVE ARRHYTHMIA EXTERNAL       Description: Elective Cardioversion External;  Recorded: 06/23/2014;  Comments: 6/9/14 and reversion back in afib in less than 1 week.   ? ME ICAR CATHETER ABLATION ATRIOVENTR NODE FUNCTION      Description: Catheter Ablation Atrioventricular Node;  Recorded: 08/13/2012;  Comments: 2002 in [place] (A fib with RVR)   ? ME REMOVAL GALLBLADDER      Description: Cholecystectomy;  Recorded: 05/01/2014;    No family history on file. Social History     Social History   ? Marital status:      Spouse name: N/A   ? Number of children: N/A   ? Years of education: N/A     Occupational History   ? Not on file.     Social History Main Topics   ? Smoking status: Never Smoker   ? Smokeless tobacco: Not on file   ? Alcohol use No   ? Drug use: No   ? Sexual activity: Not on file     Other Topics Concern   ? Not on file     Social History Narrative          Medications  Allergies   Current Outpatient Prescriptions   Medication Sig Dispense Refill   ? acetaminophen (TYLENOL) 500 MG tablet Take 1,000 mg by mouth every 6 (six) hours as needed for pain.     ? calcium carbonate-vitamin D3 600 mg(1,500mg) -500 unit cap Take 1 capsule by mouth 2 (two) times a day.     ? carvedilol (COREG) 25 MG tablet Take 12.5 mg by mouth 2 (two) times a day with meals.     ? desoximetasone (TOPICORT) 0.25 % cream Apply topically as needed.     ? donepezil (ARICEPT) 5 MG tablet Take 5 mg by mouth at bedtime.     ? levothyroxine (SYNTHROID, LEVOTHROID) 112 MCG tablet Take 112 mcg by mouth daily.     ? lisinopril (PRINIVIL) 10 MG tablet Take 1 tablet (10 mg total) by mouth daily. 30 tablet 5   ? mirabegron (MYRBETRIQ) 25 mg Tb24 ER tablet Take 25 mg by mouth at bedtime.      ? MULTIVITAMIN ORAL Take by mouth daily.     ? warfarin (COUMADIN) 2.5 MG tablet Every day of the week.  Hold today.     ? escitalopram oxalate (LEXAPRO) 10 MG tablet Take 10 mg by mouth daily.     ? methylPREDNISolone (MEDROL DOSEPACK) 4 mg tablet Take 4 mg by mouth 2 (two) times a  day. follow package directions       No current facility-administered medications for this visit.       Allergies   Allergen Reactions   ? Morphine Nausea Only         Lab Results    Chemistry/lipid CBC Cardiac Enzymes/BNP/TSH/INR   Lab Results   Component Value Date    CREATININE 0.83 01/18/2016    BUN 14 01/18/2016    K 4.0 01/18/2016     01/18/2016     (H) 01/18/2016    CO2 22 01/18/2016    Lab Results   Component Value Date    WBC 7.3 01/18/2016    HGB 14.3 01/18/2016    HCT 44.2 01/18/2016     01/18/2016     01/18/2016    Lab Results   Component Value Date    TSH 4.1 12/20/2012    INR 1.46 (H) 01/18/2016

## 2021-06-28 NOTE — PROGRESS NOTES
Patient is on a antibiotic for a saliva gland infection.  She would like to know if she should still get her infusion.  She missed her June infusion because she didn't have a ride.     Remote device check.  Please see link for full device report.  Patient was informed of results and next follow up via mail.